# Patient Record
Sex: MALE | Race: WHITE
[De-identification: names, ages, dates, MRNs, and addresses within clinical notes are randomized per-mention and may not be internally consistent; named-entity substitution may affect disease eponyms.]

---

## 2017-05-03 ENCOUNTER — HOSPITAL ENCOUNTER (OUTPATIENT)
Dept: HOSPITAL 45 - C.LAB1850 | Age: 67
Discharge: HOME | End: 2017-05-03
Attending: INTERNAL MEDICINE
Payer: COMMERCIAL

## 2017-05-03 DIAGNOSIS — Z00.00: Primary | ICD-10-CM

## 2017-05-03 DIAGNOSIS — R73.9: ICD-10-CM

## 2017-05-03 DIAGNOSIS — I10: ICD-10-CM

## 2017-05-03 LAB
ALBUMIN/GLOB SERPL: 1 {RATIO} (ref 0.9–2)
ALP SERPL-CCNC: 75 U/L (ref 45–117)
ALT SERPL-CCNC: 60 U/L (ref 12–78)
ANION GAP SERPL CALC-SCNC: 5 MMOL/L (ref 3–11)
AST SERPL-CCNC: 34 U/L (ref 15–37)
BASOPHILS # BLD: 0.06 K/UL (ref 0–0.2)
BASOPHILS NFR BLD: 0.6 %
BUN SERPL-MCNC: 15 MG/DL (ref 7–18)
BUN/CREAT SERPL: 15.1 (ref 10–20)
CALCIUM SERPL-MCNC: 9.5 MG/DL (ref 8.5–10.1)
CHLORIDE SERPL-SCNC: 103 MMOL/L (ref 98–107)
CHOLEST/HDLC SERPL: 4.8 {RATIO}
CO2 SERPL-SCNC: 31 MMOL/L (ref 21–32)
COMPLETE: YES
CREAT SERPL-MCNC: 1 MG/DL (ref 0.6–1.4)
CREAT UR-MCNC: 150 MG/DL
EOSINOPHIL NFR BLD AUTO: 231 K/UL (ref 130–400)
EST. AVERAGE GLUCOSE BLD GHB EST-MCNC: 171 MG/DL
GLOBULIN SER-MCNC: 4.1 GM/DL (ref 2.5–4)
GLUCOSE SERPL-MCNC: 183 MG/DL (ref 70–99)
GLUCOSE UR QL: 28 MG/DL
HCT VFR BLD CALC: 45.6 % (ref 42–52)
IG%: 0.3 %
IMM GRANULOCYTES NFR BLD AUTO: 22.3 %
KETONES UR QL STRIP: 70 MG/DL
LYMPHOCYTES # BLD: 2.1 K/UL (ref 1.2–3.4)
MCH RBC QN AUTO: 32.3 PG (ref 25–34)
MCHC RBC AUTO-ENTMCNC: 34.2 G/DL (ref 32–36)
MCV RBC AUTO: 94.4 FL (ref 80–100)
MONOCYTES NFR BLD: 15.1 %
NEUTROPHILS # BLD AUTO: 3.7 %
NEUTROPHILS NFR BLD AUTO: 58 %
NITRITE UR QL STRIP: 183 MG/DL (ref 0–150)
PH UR: 135 MG/DL (ref 0–200)
PMV BLD AUTO: 11.4 FL (ref 7.4–10.4)
POTASSIUM SERPL-SCNC: 4.1 MMOL/L (ref 3.5–5.1)
RATIO: 27.2 MCG/MG (ref 0–30)
RBC # BLD AUTO: 4.83 M/UL (ref 4.7–6.1)
SODIUM SERPL-SCNC: 139 MMOL/L (ref 136–145)
TSH SERPL-ACNC: 1.46 UIU/ML (ref 0.3–4.5)
VERY LOW DENSITY LIPOPROT CALC: 37 MG/DL
WBC # BLD AUTO: 9.4 K/UL (ref 4.8–10.8)

## 2018-01-23 ENCOUNTER — HOSPITAL ENCOUNTER (OUTPATIENT)
Dept: HOSPITAL 45 - C.LAB1850 | Age: 68
Discharge: HOME | End: 2018-01-23
Attending: PHYSICIAN ASSISTANT
Payer: COMMERCIAL

## 2018-01-23 DIAGNOSIS — T14.8XXA: Primary | ICD-10-CM
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
## 2018-04-18 ENCOUNTER — HOSPITAL ENCOUNTER (OUTPATIENT)
Dept: HOSPITAL 45 - C.LAB1850 | Age: 68
Discharge: HOME | End: 2018-04-18
Attending: INTERNAL MEDICINE
Payer: COMMERCIAL

## 2018-04-18 DIAGNOSIS — M10.9: ICD-10-CM

## 2018-04-18 DIAGNOSIS — J30.9: ICD-10-CM

## 2018-04-18 DIAGNOSIS — E11.65: ICD-10-CM

## 2018-04-18 DIAGNOSIS — I10: Primary | ICD-10-CM

## 2018-04-18 LAB
ALBUMIN SERPL-MCNC: 3.8 GM/DL (ref 3.4–5)
ALP SERPL-CCNC: 56 U/L (ref 45–117)
ALT SERPL-CCNC: 37 U/L (ref 12–78)
AST SERPL-CCNC: 26 U/L (ref 15–37)
BUN SERPL-MCNC: 25 MG/DL (ref 7–18)
CALCIUM SERPL-MCNC: 9.1 MG/DL (ref 8.5–10.1)
CO2 SERPL-SCNC: 30 MMOL/L (ref 21–32)
CREAT SERPL-MCNC: 1.06 MG/DL (ref 0.6–1.4)
GLUCOSE SERPL-MCNC: 104 MG/DL (ref 70–99)
HBA1C MFR BLD: 5.7 % (ref 4.5–5.6)
POTASSIUM SERPL-SCNC: 3.7 MMOL/L (ref 3.5–5.1)
PROT SERPL-MCNC: 7.8 GM/DL (ref 6.4–8.2)
SODIUM SERPL-SCNC: 139 MMOL/L (ref 136–145)

## 2018-04-19 ENCOUNTER — HOSPITAL ENCOUNTER (OUTPATIENT)
Dept: HOSPITAL 45 - C.LAB1850 | Age: 68
Discharge: HOME | End: 2018-04-19
Attending: PHYSICIAN ASSISTANT
Payer: COMMERCIAL

## 2018-04-19 DIAGNOSIS — K92.0: Primary | ICD-10-CM

## 2018-04-19 LAB
BASOPHILS # BLD: 0.03 K/UL (ref 0–0.2)
BASOPHILS NFR BLD: 0.3 %
EOS ABS #: 0.47 K/UL (ref 0–0.5)
EOSINOPHIL NFR BLD AUTO: 237 K/UL (ref 130–400)
HCT VFR BLD CALC: 41.8 % (ref 42–52)
HGB BLD-MCNC: 14.2 G/DL (ref 14–18)
IG#: 0.03 K/UL (ref 0–0.02)
IMM GRANULOCYTES NFR BLD AUTO: 27.9 %
LYMPHOCYTES # BLD: 2.64 K/UL (ref 1.2–3.4)
MCH RBC QN AUTO: 31.9 PG (ref 25–34)
MCHC RBC AUTO-ENTMCNC: 34 G/DL (ref 32–36)
MCV RBC AUTO: 93.9 FL (ref 80–100)
MONO ABS #: 1.29 K/UL (ref 0.11–0.59)
MONOCYTES NFR BLD: 13.6 %
NEUT ABS #: 5 K/UL (ref 1.4–6.5)
NEUTROPHILS # BLD AUTO: 5 %
NEUTROPHILS NFR BLD AUTO: 52.9 %
PMV BLD AUTO: 10.7 FL (ref 7.4–10.4)
RED CELL DISTRIBUTION WIDTH CV: 12.8 % (ref 11.5–14.5)
RED CELL DISTRIBUTION WIDTH SD: 43.7 FL (ref 36.4–46.3)
WBC # BLD AUTO: 9.46 K/UL (ref 4.8–10.8)

## 2018-04-26 ENCOUNTER — HOSPITAL ENCOUNTER (EMERGENCY)
Dept: HOSPITAL 45 - C.EDB | Age: 68
Discharge: HOME | End: 2018-04-26
Payer: COMMERCIAL

## 2018-04-26 VITALS — OXYGEN SATURATION: 98 % | SYSTOLIC BLOOD PRESSURE: 174 MMHG | DIASTOLIC BLOOD PRESSURE: 98 MMHG | HEART RATE: 75 BPM

## 2018-04-26 VITALS
BODY MASS INDEX: 33.81 KG/M2 | HEIGHT: 67.01 IN | WEIGHT: 215.39 LBS | BODY MASS INDEX: 33.81 KG/M2 | HEIGHT: 67.01 IN | WEIGHT: 215.39 LBS

## 2018-04-26 VITALS — TEMPERATURE: 98.24 F

## 2018-04-26 DIAGNOSIS — Z79.899: ICD-10-CM

## 2018-04-26 DIAGNOSIS — Z87.891: ICD-10-CM

## 2018-04-26 DIAGNOSIS — Z79.84: ICD-10-CM

## 2018-04-26 DIAGNOSIS — K06.8: Primary | ICD-10-CM

## 2018-04-26 DIAGNOSIS — Z79.82: ICD-10-CM

## 2018-04-26 LAB
ALBUMIN SERPL-MCNC: 4.3 GM/DL (ref 3.4–5)
ALP SERPL-CCNC: 66 U/L (ref 45–117)
ALT SERPL-CCNC: 39 U/L (ref 12–78)
AST SERPL-CCNC: 26 U/L (ref 15–37)
BASOPHILS # BLD: 0.04 K/UL (ref 0–0.2)
BASOPHILS NFR BLD: 0.4 %
BUN SERPL-MCNC: 17 MG/DL (ref 7–18)
CALCIUM SERPL-MCNC: 9.3 MG/DL (ref 8.5–10.1)
CO2 SERPL-SCNC: 31 MMOL/L (ref 21–32)
CREAT SERPL-MCNC: 1.21 MG/DL (ref 0.6–1.4)
EOS ABS #: 0.29 K/UL (ref 0–0.5)
EOSINOPHIL NFR BLD AUTO: 248 K/UL (ref 130–400)
GLUCOSE SERPL-MCNC: 95 MG/DL (ref 70–99)
HCT VFR BLD CALC: 44.7 % (ref 42–52)
HGB BLD-MCNC: 15.5 G/DL (ref 14–18)
IG#: 0.04 K/UL (ref 0–0.02)
IMM GRANULOCYTES NFR BLD AUTO: 23.5 %
INR PPP: 1 (ref 0.9–1.1)
LYMPHOCYTES # BLD: 2.1 K/UL (ref 1.2–3.4)
MCH RBC QN AUTO: 32.2 PG (ref 25–34)
MCHC RBC AUTO-ENTMCNC: 34.7 G/DL (ref 32–36)
MCV RBC AUTO: 92.9 FL (ref 80–100)
MONO ABS #: 1.46 K/UL (ref 0.11–0.59)
MONOCYTES NFR BLD: 16.3 %
NEUT ABS #: 5 K/UL (ref 1.4–6.5)
NEUTROPHILS # BLD AUTO: 3.2 %
NEUTROPHILS NFR BLD AUTO: 56.2 %
PMV BLD AUTO: 10.4 FL (ref 7.4–10.4)
POTASSIUM SERPL-SCNC: 3.6 MMOL/L (ref 3.5–5.1)
PROT SERPL-MCNC: 8.7 GM/DL (ref 6.4–8.2)
PTT PATIENT: 28.9 SECONDS (ref 21–31)
RED CELL DISTRIBUTION WIDTH CV: 12.8 % (ref 11.5–14.5)
RED CELL DISTRIBUTION WIDTH SD: 43.6 FL (ref 36.4–46.3)
SODIUM SERPL-SCNC: 139 MMOL/L (ref 136–145)
WBC # BLD AUTO: 8.93 K/UL (ref 4.8–10.8)

## 2018-04-26 NOTE — CONSULTATION REPORT
DATE OF CONSULTATION:  04/26/2018

 

CHIEF COMPLAINT:  Bleeding from the gums.

 

HISTORY OF PRESENT ILLNESS:  Mr. Ludwig is a 67-year-old man with about a day

and a half history of spontaneous bleeding from the right gum.  He has

carefully reviewed all the foods he has eaten and did note that he had some

popcorn about the night prior to the starting of this bleeding but cannot

directly relate any oral trauma.  He has not had any dental or oral surgical

procedures and in general, he has been in good health, but it is interesting

to note that he has upper GI scheduled this coming week because he did have

one episode of some emesis with mild blood within it.  I will defer you to

his ER history and physical, but he is in generally good health and he has

been on an 81 mg aspirin and meloxicam up until recently.  These were

recently stopped in preparation for his upper GI.  His initial evaluation

here in the ER was completed and it included labs.  He has a normal platelet

count and a normal coag profile, a normal white count and a normal

hemoglobin.  On examination, he is at the bedside with no acute distress but

is actively spitting out blood into a cup.  There is no swelling in his neck.

 There is no facial ecchymosis.  No signs of trauma.  Dentition is in good

repair, but there is active moderately brisk bleeding coming from the gum

tissue in the area between the mandibular right first and second molars (#30

and 31).  I gave him some additional 1% lidocaine with epinephrine as a local

infiltration right in that area which slowed the bleeding to a very slow ooze

and then I used silver nitrate to further cauterize that with a resulting

good hemostasis.  I personally observed it for 5-10 minutes with no increased

bleeding and discussed with the ER staff observing him for a bit longer and

then discharging him to home.  If there is no resumption of the bleeding, I

will ask him to call me in the office in the morning with the status report

and then we will plan appropriate followup as needed from there.

## 2018-04-26 NOTE — EMERGENCY ROOM VISIT NOTE
History


First contact with patient:  17:01


Chief Complaint:  BLEEDING


Stated Complaint:  GUMS BLEEDING


Nursing Triage Summary:  


Patient presents ambulatory to triage with c/o gums have been bleeding since 


4/25/18 around noon





Aspirin 81mg daily - states he did not take this today





Denies other injury





History of Present Illness


The patient is a 67 year old male who presents to the Emergency Room via 

private vehicle accompanied by female with complaints of "gums bleeding".  The 

patient states that yesterday around noon time he began with spontaneous 

bleeding from the gumline at the space between the right second and third 

molar.  He states that he did have popcorn 2 nights ago and then began with the 

bleeding around noontime the next day and does not believe that this is the 

cause.  He denies any pain.  He takes 81 mg aspirin daily but did not take 

today because the bleeding.  No other blood thinners.  He denies any other 

underlying bleeding disorder.  The only other important note he states is that 

last week he took an NSAID for his knee and vomited blood.  He has an EGD 

scheduled for Tuesday.  He states that he was seen by his dentist yesterday who 

evaluated his urine he states that he also had a CBC done last week and it was 

normal.  He again denies any trauma or injury.  He has tried rinsing the mouth 

with cold water without relief.





Review of Systems


A complete 6-point Review of Systems was discussed with the patient, with 

pertinent positives and negatives listed in the History of Present Illness. All 

remaining Review of Systems questions can be considered negative unless 

otherwise specified.





Past Medical/Surgical History


Surgical Problems:


(1) Post-operative state








Social History


Smoking Status:  Former Smoker


Marital Status:  


Housing Status:  lives with significant other





Current/Historical Medications


Scheduled


Allopurinol (Zyloprim), 300 MG PO DAILY


Aspirin (Aspirin Ec), 81 MG PO DAILY


Cetirizine (Zyrtec), 10 MG PO HS


Cholecalciferol (Vitamin D-3), 800 UNITS PO DAILY


Dulaglutide (Trulicity), 0.75 MG SC WK


Epinephrine (Epipen), 0.3 MG IM UD


Fluticasone Propionate (Nasal) (Allergy Nasal [place] [address]), 1 SPRAY NA BID


Metformin Hcl (Glucophage), 1,000 MG PO BID


Multivitamin (Multivitamin), 1 TAB PO QAM


Olmesartan/Hctz (Benicar Hct 20/12.5), 1 TAB PO QAM


Pantoprazole Sodium (Protonix), 40 MG PO DAILY





Scheduled PRN


Clotrimazole (Mycelex), 10 MG MT for SORE THROAT





Physical Exam


Vital Signs











  Date Time  Temp Pulse Resp B/P (MAP) Pulse Ox O2 Delivery O2 Flow Rate FiO2


 


4/26/18 22:11  75 18 174/98 98   


 


4/26/18 20:51  69 18 179/106 97 Room Air  


 


4/26/18 18:36  69 18 159/97 98 Room Air  


 


4/26/18 16:57 36.8 76 16 186/102 96 Room Air  











Physical Exam


VITAL SIGNS - Vital signs and nursing notes were reviewed.  Stable.  

Hypertensive.


GENERAL -67-year-old male appearing his stated age who is in no acute distress. 

Communicates well with provider and answers questions appropriately.


SKIN - Without rashes.  No meningeal or petechial rash.


HEAD - NC/AT.  No franklin signs or raccoon's eyes.


EYES - PERRL with EOMI bilaterally. Sclera anicteric. Palpebral conjunctiva 

pink and moist with no injection noted.  No hyphema.


EARS - No deformities of external structures noted on gross examination 

bilaterally.  No hemotympanum.  External auditory canals without discharge or 

otorrhea. Tympanic membranes pearly gray without retraction or bulging. No 

fluid or purulent material visualized behind the TM. Handle of malleus, umbo, 

cone of light, pars tensa/flaccid all easily visualized.


NOSE - Midline and without cyanosis. No epistaxis or purulent drainage noted. 

Septum midline without deviation or septal hematoma noted.


MOUTH/OROPHARYNX - Without perioral cyanosis. Buccal mucosa pink and moist and 

without leukoplakia. Tongue midline with equal elevation of palate bilaterally. 

No tonsillar hypertrophy, erythema, or exudates noted.  Between the second and 

third molar of the posterior inferior right dentition there is a small trickle 

of blood.  No hemorrhage.  No evidence of trauma.  Fair dentition noted.





Medical Decision & Procedures


Laboratory Results


4/26/18 17:55








Red Blood Count 4.81, Mean Corpuscular Volume 92.9, Mean Corpuscular Hemoglobin 

32.2, Mean Corpuscular Hemoglobin Concent 34.7, Mean Platelet Volume 10.4, 

Neutrophils (%) (Auto) 56.2, Lymphocytes (%) (Auto) 23.5, Monocytes (%) (Auto) 

16.3, Eosinophils (%) (Auto) 3.2, Basophils (%) (Auto) 0.4, Neutrophils # (Auto

) 5.00, Lymphocytes # (Auto) 2.10, Monocytes # (Auto) 1.46, Eosinophils # (Auto

) 0.29, Basophils # (Auto) 0.04





4/26/18 17:55

















Test


  4/26/18


17:55


 


White Blood Count


  8.93 K/uL


(4.8-10.8)


 


Red Blood Count


  4.81 M/uL


(4.7-6.1)


 


Hemoglobin


  15.5 g/dL


(14.0-18.0)


 


Hematocrit 44.7 % (42-52) 


 


Mean Corpuscular Volume


  92.9 fL


()


 


Mean Corpuscular Hemoglobin


  32.2 pg


(25-34)


 


Mean Corpuscular Hemoglobin


Concent 34.7 g/dl


(32-36)


 


Platelet Count


  248 K/uL


(130-400)


 


Mean Platelet Volume


  10.4 fL


(7.4-10.4)


 


Neutrophils (%) (Auto) 56.2 % 


 


Lymphocytes (%) (Auto) 23.5 % 


 


Monocytes (%) (Auto) 16.3 % 


 


Eosinophils (%) (Auto) 3.2 % 


 


Basophils (%) (Auto) 0.4 % 


 


Neutrophils # (Auto)


  5.00 K/uL


(1.4-6.5)


 


Lymphocytes # (Auto)


  2.10 K/uL


(1.2-3.4)


 


Monocytes # (Auto)


  1.46 K/uL


(0.11-0.59)


 


Eosinophils # (Auto)


  0.29 K/uL


(0-0.5)


 


Basophils # (Auto)


  0.04 K/uL


(0-0.2)


 


RDW Standard Deviation


  43.6 fL


(36.4-46.3)


 


RDW Coefficient of Variation


  12.8 %


(11.5-14.5)


 


Immature Granulocyte % (Auto) 0.4 % 


 


Immature Granulocyte # (Auto)


  0.04 K/uL


(0.00-0.02)


 


Prothrombin Time


  10.8 SECONDS


(9.0-12.0)


 


Prothromb Time International


Ratio 1.0 (0.9-1.1) 


 


 


Activated Partial


Thromboplast Time 28.9 SECONDS


(21.0-31.0)


 


Partial Thromboplastin Ratio 1.1 


 


Anion Gap


  3.0 mmol/L


(3-11)


 


Est Creatinine Clear Calc


Drug Dose 64.2 ml/min 


 


 


Estimated GFR (


American) 70.4 


 


 


Estimated GFR (Non-


American 60.7 


 


 


BUN/Creatinine Ratio 14.3 (10-20) 


 


Calcium Level


  9.3 mg/dl


(8.5-10.1)


 


Total Bilirubin


  0.8 mg/dl


(0.2-1)


 


Aspartate Amino Transf


(AST/SGOT) 26 U/L (15-37) 


 


 


Alanine Aminotransferase


(ALT/SGPT) 39 U/L (12-78) 


 


 


Alkaline Phosphatase


  66 U/L


()


 


Total Protein


  8.7 gm/dl


(6.4-8.2)


 


Albumin


  4.3 gm/dl


(3.4-5.0)


 


Globulin


  4.4 gm/dl


(2.5-4.0)


 


Albumin/Globulin Ratio 1.0 (0.9-2) 











Medical Decision


Patient was seen and evaluated as above in room D3. Review was performed of 

nursing notes and vital signs. After obtaining a thorough history and physical 

examination the above work up was performed.  I did elect to obtain baseline 

labs because of his presentation.  No underlying normality with the CBC, coags 

or metabolic panel that is emergent.  I did attempt cold water rinses, and 

teabags placed in the region.  I then discussed this with the attending 

physician and subsequently spoke with the on-call oral maxillofacial surgeon, 

Dr. Robbins, and it was recommended that I infiltrate the region with 1% 

lidocaine with epinephrine, packed the region with gauze for 10 minutes, and 

apply Surgicel in that order in case of persistent bleeding.  He requested that 

I call him back if this did not achieve hemostasis.  I then obtain consent, and 

infiltrated 4 cc of 1% buffered lidocaine with epinephrine into the gumline.  

This did help slow the bleeding some but it persisted.  I then packed the 

region with gauze.  He was observed for 15 minutes.  It persisted.  I then 

remove the gauze and placed a an entire layer of Surgicel around this and 

packed the region.  He was then observed for 20 minutes.  This persisted.  I 

then called the oral maxillofacial surgeon, Dr. Robbins again and he came to 

evaluate the patient.  He was able to adequately achieve hemostasis.  Patient 

is to call his office tomorrow shortly after 8 AM to inform him upon his 

status.  Patient was observed here for greater than half hour after Dr. Robbins 

was able to achieve hemostasis and there was no rebleed.  Patient appears 

stable for outpatient management.  He was educated upon worrisome symptoms 

which to return.  He also was informed that he has elevated blood pressure here 

and is to follow up with family doctor. The patient was educated upon management

, had questions answered prior to discharge, and was discharged home in good 

condition.





Case was discussed with the attending physician.





I attest that I have personally reviewed the patient medication list.





I attest that I have reviewed the patient's blood pressure and it was found to 

be elevated, he is to follow up with family doctor.





In the evaluation and treatment of this patient the following differential 

diagnoses were entertained: dental trauma, malignancy, bleeding disorder, 

amoung others.





Impression





 Primary Impression:  


 Gums, bleeding





Departure Information


Dispostion


Home / Self-Care





Condition


GOOD





Referrals


Rolando Wise M.D. (PCP)








Rod Robbins D.D.S.





Patient Instructions


My [facility]





Additional Instructions





You were seen in the emergency department for gum bleeding.





At this time I recommend follow-up with Dr. Robbins by calling his office 

tomorrow morning at 8 AM and if it is still bleeding he wants to see you.





Please return with any new/concerning symptoms.

## 2018-05-01 ENCOUNTER — HOSPITAL ENCOUNTER (OUTPATIENT)
Dept: HOSPITAL 45 - C.GI | Age: 68
Discharge: HOME | End: 2018-05-01
Attending: INTERNAL MEDICINE
Payer: COMMERCIAL

## 2018-05-01 VITALS — OXYGEN SATURATION: 97 % | SYSTOLIC BLOOD PRESSURE: 141 MMHG | DIASTOLIC BLOOD PRESSURE: 97 MMHG | HEART RATE: 70 BPM

## 2018-05-01 VITALS
WEIGHT: 210.43 LBS | HEIGHT: 67.01 IN | BODY MASS INDEX: 33.03 KG/M2 | HEIGHT: 67.01 IN | BODY MASS INDEX: 33.03 KG/M2 | WEIGHT: 210.43 LBS

## 2018-05-01 DIAGNOSIS — Z79.84: ICD-10-CM

## 2018-05-01 DIAGNOSIS — Z88.2: ICD-10-CM

## 2018-05-01 DIAGNOSIS — Z88.1: ICD-10-CM

## 2018-05-01 DIAGNOSIS — K29.70: ICD-10-CM

## 2018-05-01 DIAGNOSIS — Z79.82: ICD-10-CM

## 2018-05-01 DIAGNOSIS — K92.0: Primary | ICD-10-CM

## 2018-05-01 DIAGNOSIS — Z96.643: ICD-10-CM

## 2018-05-01 DIAGNOSIS — Z96.651: ICD-10-CM

## 2018-05-01 DIAGNOSIS — M19.90: ICD-10-CM

## 2018-05-01 DIAGNOSIS — Z87.891: ICD-10-CM

## 2018-05-01 DIAGNOSIS — K21.9: ICD-10-CM

## 2018-05-01 DIAGNOSIS — E11.9: ICD-10-CM

## 2018-05-01 NOTE — DISCHARGE INSTRUCTIONS
Endoscopy Patient Instructions


Date / Procedure(s) Performed


May 1, 2018.


EGD





Allergy Information


Coded Allergies:  


     Bacitracin (Verified  Allergy, Mild, SWELLING, REDNESS, 4/26/18)


     Neomycin (Verified  Allergy, Mild, SWELLING, REDNESS, 4/26/18)


     Polymyxin B (Verified  Allergy, Mild, SWELLING, REDNESS, 4/26/18)


     BEE STING (Verified  Allergy, Unknown, HIVES, 4/26/18)


     Celecoxib (Verified  Allergy, Unknown, RASH, 4/26/18)


     Cephalosporins (Verified  Allergy, Unknown, ITCHINESS,RASH, 4/26/18)


     Sulfa Drugs (Verified  Adverse Reaction, Unknown, BLOODY DIARRHEA, 4/26/18)





Discharge Date / Findings


May 1, 2018.


Gastritis s/p biopsies


Biopsies of distal esophagus





Medication Instructions


Stopped Medication(s):  


Patient was told to stop aspirin.


OK to resume all medications today as prescribed





Reported Home Medications








 Medications  Dose


 Route/Sig


 Max Daily Dose Days Date Category


 


 Zyloprim


  (Allopurinol) 300


 Mg Tab  300 Mg


 PO DAILY


   30 4/26/18 Reported


 


 Vitamin D-3


  (Cholecalciferol)


 400 Unit Tab  800 Units


 PO DAILY


    4/26/18 Reported


 


 Trulicity


  (Dulaglutide)


 0.75 Mg/0.5 Ml Inj  0.75 Mg


 SC WK


    4/26/18 Reported


 


 Protonix


  (Pantoprazole


 Sodium) 40 Mg Tab  40 Mg


 PO DAILY


   30 4/26/18 Reported


 


 Allergy Nasal


 [place] [address]


  (Fluticasone


 Propionate


  (Nasal)) 50


 Mcg/Act Spr  1 Spray


 NA BID


    4/26/18 Reported


 


 Mycelex


  (Clotrimazole) 10


 Mg Tro  10 Mg


 MT  PRN


    4/26/18 Reported


 


 Aspirin Ec


  (Aspirin) 81 Mg


 Tab  81 Mg


 PO DAILY


    4/26/18 Reported


 


 Glucophage


  (Metformin Hcl)


 1,000 Mg Tab  1,000 Mg


 PO BID


    4/26/18 Reported


 


 Multivitamin


  (Multivitamins)


 Tab  1 Tab


 PO QAM


    9/8/16 Reported


 


 Epipen


  (Epinephrine) 0.3


 Mg/0.3 Ml Inj  0.3 Mg


 IM UD


    9/8/16 Reported


 


 Zyrtec


  (Cetirizine HCl)


 10 Mg Tab  10 Mg


 PO HS


    9/8/16 Reported


 


 Benicar Hct


 20/12.5


  (Olmesartan/HCTZ)


 Tab  1 Tab


 PO QAM


    9/8/16 Reported











Provider Instructions





Activity Restrictions





-  No exercising or heavy lifting for 24 hours. 


-  Do not drink alcohol the day of the procedure.


-  Do not drive a car or operate machinery until the day after the procedure.


-  Do not make any important decisions or sign important papers in 24 hours 

after the procedure.





Following Day:





-  Return to full activity which may include returning to work/school.





Diet





Start your diet with liquids and light foods (jello, soup, juice, toast).  Then 

eat your usual diet if not nauseated.





Treatment For Common After Affects





For mild abdominal pain, bloating, or excessive gas:





-  Rest


-  Eat lightly


-  Lie on right side





Follow-Up Information


Follow-up with Dr. Rolando Wise as scheduled





Anesthesia Information





What You Should Know





You have had a procedure that required some medicine to reduce anxiety and 

discomfort. This treatment is called moderate sedation.  


After receiving the treatment, you may be sleepy, but you will be able to 

breathe on your own.  The effects of the treatment may last for several hours.








Follow these instructions along with Activity/Diet recommendations noted above:





*  Do NOT do anything where dizziness or clumsiness would be dangerous.





*  Rest quietly at home today, then you can be up and about tomorrow.





*  Have a responsible person stay with you the rest of today.





*  You may have had an I.V. today.  If so, you may take the dressing off later 

today.





Recommendations


 


Call your doctor if:





*  Trouble breathing 





*  Continuous vomiting for more than 24 hours








*  Temperature above 101 degrees





*  Severe abdominal pain or bloating





*  Pain not relieved by pain medicine ordered





*  There is increased drainage or redness from any incision





*  A large amount of rectal bleeding greater than 2-3 tablespoons. 


   (If you had a polyp/s removed or have hemorrhoids, a small amount of blood -


    from the rectum is to be expected.)





*  You have any unanswered questions or concerns.








IN THE EVENT OF A SERIOUS EMERGENCY, GO TO THE NEAREST EMERGENCY ROOM








       Your discharge instructions were prepared by provider Antwan Ibarra.





 Patient Instructions Signature Page














Christian Ludwig 











Patient (or Guardian) Signature/Date:____________________________________ I 

have read and understand the instructions given to me by my caregivers.








Caregiver/RN/Doctor Signature/Date:____________________________________











The above-named patient and/or guardian has received patient instructions on 

this date.





























+  Original Patient Signature Page (only) stays with chart.  Please make copy 

for patient.

## 2018-05-01 NOTE — ANESTHESIOLOGY PROGRESS NOTE
Anesthesia Post Op Note


Date & Time


May 1, 2018 at 10:55





Vital Signs


Pain Intensity:  0





Vital Signs Past 12 Hours








  Date Time  Temp Pulse Resp B/P (MAP) Pulse Ox O2 Delivery O2 Flow Rate FiO2


 


5/1/18 10:42  69 18 158/95 (116) 97 Room Air  


 


5/1/18 10:32  76 18 146/89 (108) 94 Room Air  


 


5/1/18 09:17 36.9 68 18 158/91 (113) 98 Room Air  











Notes


Mental Status:  alert / awake / arousable, participated in evaluation


Pt Amnestic to Procedure:  Yes


Nausea / Vomiting:  adequately controlled


Pain:  adequately controlled


Airway Patency, RR, SpO2:  stable & adequate


BP & HR:  stable & adequate


Hydration State:  stable & adequate


Anesthetic Complications:  no major complications apparent

## 2018-05-01 NOTE — ENDO HISTORY AND PHYSICAL
History & Physical


Date of Service:


May 1, 2018.


Chief Complaint:


Hematemesis


Referring Physician:


Dr. Rolando Wise


History of Present Illness


66 yo CM who presents for EGD secondary to hematemesis.





Past Surgical History


Hx Cardiac Surgery:  No


Hx Internal Defibrillator:  No


Hx Pacemaker:  No


Hx Abdominal Surgery:  No


Hx of Implantable Prosthesis:  No


Hx Post-Op Nausea and Vomiting:  No


Hx Cancer Surgery:  No


Hx Thoracic Surgery:  No


Hx Orthopedic:  Yes (R KNEE SCOPE 1993,RT TKA FEB 2010,R THR 2011, L THR 2016)


Hx Urinary Tract Surgery:  Yes (SURGERY ON TESTICLE)





Family History


IBD





Social History


Smoking Status:  Former Smoker


Hx Substance Use:  No


Hx Alcohol Use:  Yes (RARELY HAS 1 BEER MONTHY)





Allergies


Coded Allergies:  


     Bacitracin (Verified  Allergy, Mild, SWELLING, REDNESS, 4/26/18)


     Neomycin (Verified  Allergy, Mild, SWELLING, REDNESS, 4/26/18)


     Polymyxin B (Verified  Allergy, Mild, SWELLING, REDNESS, 4/26/18)


     BEE STING (Verified  Allergy, Unknown, HIVES, 4/26/18)


     Celecoxib (Verified  Allergy, Unknown, RASH, 4/26/18)


     Cephalosporins (Verified  Allergy, Unknown, ITCHINESS,RASH, 4/26/18)


     Sulfa Drugs (Verified  Adverse Reaction, Unknown, BLOODY DIARRHEA, 4/26/18)





Current Medications





Reported Home Medications








 Medications  Dose


 Route/Sig


 Max Daily Dose Days Date Category


 


 Zyloprim


  (Allopurinol) 300


 Mg Tab  300 Mg


 PO DAILY


   30 4/26/18 Reported


 


 Vitamin D-3


  (Cholecalciferol)


 400 Unit Tab  800 Units


 PO DAILY


    4/26/18 Reported


 


 Trulicity


  (Dulaglutide)


 0.75 Mg/0.5 Ml Inj  0.75 Mg


 SC WK


    4/26/18 Reported


 


 Protonix


  (Pantoprazole


 Sodium) 40 Mg Tab  40 Mg


 PO DAILY


   30 4/26/18 Reported


 


 Allergy Nasal


 [place] 24 [address]


  (Fluticasone


 Propionate


  (Nasal)) 50


 Mcg/Act Spr  1 Spray


 NA BID


    4/26/18 Reported


 


 Mycelex


  (Clotrimazole) 10


 Mg Tro  10 Mg


 MT  PRN


    4/26/18 Reported


 


 Aspirin Ec


  (Aspirin) 81 Mg


 Tab  81 Mg


 PO DAILY


    4/26/18 Reported


 


 Glucophage


  (Metformin Hcl)


 1,000 Mg Tab  1,000 Mg


 PO BID


    4/26/18 Reported


 


 Multivitamin


  (Multivitamins)


 Tab  1 Tab


 PO QAM


    9/8/16 Reported


 


 Epipen


  (Epinephrine) 0.3


 Mg/0.3 Ml Inj  0.3 Mg


 IM UD


    9/8/16 Reported


 


 Zyrtec


  (Cetirizine HCl)


 10 Mg Tab  10 Mg


 PO HS


    9/8/16 Reported


 


 Benicar Hct


 20/12.5


  (Olmesartan/HCTZ)


 Tab  1 Tab


 PO QAM


    9/8/16 Reported











Vital Signs


Weight (Kilograms):  95.45


Height (Feet):  5


Height (Inches):  7











  Date Time  Temp Pulse Resp B/P (MAP) Pulse Ox O2 Delivery O2 Flow Rate FiO2


 


5/1/18 09:17 36.9 68 18 158/91 (113) 98 Room Air  











Physical Exam


General Appearance:  WD/WN, no apparent distress


Respiratory/Chest:  


   Auscultation:  breath sounds normal


Cardiovascular:  


   Heart Auscultation:  RRR


Abdomen:  


   Bowel Sounds:  normal


   Inspection & Palpation:  soft, non-distended, no tenderness, guarding & 

rebound





Assessment and Plan


Assessment:


66 yo CM who presents for EGD secondary to hematemesis.








Plan:


Proceed with EGD.

## 2018-05-01 NOTE — GI REPORT
Patient Name: Christian Ludwig

Procedure Date: 5/1/2018 10:06 AM

MRN: [id number]

Account Number: [id number]

YOB: 1950

Admit Type: Outpatient

Age: 67

Gender: Male

Attending MD: Antwan Ibarra DO

Procedure:            Upper GI endoscopy

Providers:            Antwan Ibarra DO

Referring MD:         Rolando Juarez

Indications:          Hematemesis

Medicines:            Monitored Anesthesia Care

Complications:        No immediate complications.

Estimated Blood Loss: Estimated blood loss: none.

Procedure:            Pre-Anesthesia Assessment:

                      - Prior to the procedure, a History and Physical was 

                      performed, and patient medications and allergies were 

                      reviewed. The patient's tolerance of previous 

                      anesthesia was also reviewed. The risks and benefits of 

                      the procedure and the sedation options and risks were 

                      discussed with the patient. All questions were 

                      answered, and informed consent was obtained. Prior 

                      Anticoagulants: The patient has taken aspirin, last 

                      dose was 5 days prior to procedure. ASA Grade 

                      Assessment: III - A patient with severe systemic 

                      disease. After reviewing the risks and benefits, the 

                      patient was deemed in satisfactory condition to undergo 

                      the procedure.

                      After obtaining informed consent, the endoscope was 

                      passed under direct vision. Throughout the procedure, 

                      the patient's blood pressure, pulse, and oxygen 

                      saturations were monitored continuously. The scope was 

                      introduced through the mouth, and advanced to the 

                      second part of duodenum. The upper GI endoscopy was 

                      accomplished without difficulty. The patient tolerated 

                      the procedure well.

Findings:

     The Z-line was irregular. Biopsies were taken with a cold forceps for 

     histology.

     Localized mild inflammation characterized by erythema was found in the 

     gastric antrum. Biopsies were taken with a cold forceps for histology.

     The examined duodenum was normal.

Impression:           - Z-line irregular. Biopsied.

                      - Gastritis. Biopsied.

                      - Normal examined duodenum.

Recommendation:       - Resume previous diet.

                      - Continue present medications.

                      - Await pathology results.

                      - Return to primary care physician as previously 

                      scheduled.

Antwan Ibarra DO

5/1/2018 11:03:03 AM

This report has been signed electronically.

Note Initiated On: 5/1/2018 10:06 AM

Number of Addenda: 0

     I attest to the content of the Intraoperative Record and orders 

     documented therein, exceptions below



{[id number]}